# Patient Record
Sex: FEMALE | Race: WHITE | NOT HISPANIC OR LATINO | URBAN - METROPOLITAN AREA
[De-identification: names, ages, dates, MRNs, and addresses within clinical notes are randomized per-mention and may not be internally consistent; named-entity substitution may affect disease eponyms.]

---

## 2017-05-15 ENCOUNTER — APPOINTMENT (RX ONLY)
Dept: URBAN - METROPOLITAN AREA CLINIC 44 | Facility: CLINIC | Age: 47
Setting detail: DERMATOLOGY
End: 2017-05-15

## 2017-05-15 DIAGNOSIS — L71.8 OTHER ROSACEA: ICD-10-CM | Status: STABLE

## 2017-05-15 PROCEDURE — 99213 OFFICE O/P EST LOW 20 MIN: CPT

## 2017-05-15 PROCEDURE — ? PRESCRIPTION

## 2017-05-15 PROCEDURE — ? COUNSELING

## 2017-05-15 PROCEDURE — ? OTHER

## 2017-05-15 PROCEDURE — ? TREATMENT REGIMEN

## 2017-05-15 RX ORDER — IVERMECTIN 10 MG/G
CREAM TOPICAL QAM
Qty: 1 | Refills: 6 | Status: ERX

## 2017-05-15 RX ORDER — DOXYCYCLINE 40 MG/1
CAPSULE ORAL
Qty: 30 | Refills: 11 | Status: ERX

## 2017-05-15 RX ORDER — PIMECROLIMUS 10 MG/G
CREAM TOPICAL
Qty: 1 | Refills: 0 | Status: ERX | COMMUNITY
Start: 2017-05-15

## 2017-05-15 RX ADMIN — PIMECROLIMUS: 10 CREAM TOPICAL at 15:07

## 2017-05-15 ASSESSMENT — LOCATION SIMPLE DESCRIPTION DERM
LOCATION SIMPLE: LEFT CHEEK
LOCATION SIMPLE: RIGHT CHEEK

## 2017-05-15 ASSESSMENT — LOCATION DETAILED DESCRIPTION DERM
LOCATION DETAILED: LEFT INFERIOR CENTRAL MALAR CHEEK
LOCATION DETAILED: RIGHT INFERIOR CENTRAL MALAR CHEEK

## 2017-05-15 ASSESSMENT — LOCATION ZONE DERM: LOCATION ZONE: FACE

## 2017-05-15 NOTE — PROCEDURE: OTHER
Note Text (......Xxx Chief Complaint.): This diagnosis correlates with the
Detail Level: Zone
Other (Free Text): Discussed BBL for blood vessels around neck and face, discussed procedure and pricing.

## 2023-07-21 NOTE — PROCEDURE: TREATMENT REGIMEN
Continue Regimen: Soolantra 1% apply to full face qd, Oracea 40mg one tab po qd 30 min before meal.
Modify Regimen: Add Elidel prn for itching
Detail Level: Simple
Prednisone Counseling:  I discussed with the patient the risks of prolonged use of prednisone including but not limited to weight gain, insomnia, osteoporosis, mood changes, diabetes, susceptibility to infection, glaucoma and high blood pressure.  In cases where prednisone use is prolonged, patients should be monitored with blood pressure checks, serum glucose levels and an eye exam.  Additionally, the patient may need to be placed on GI prophylaxis, PCP prophylaxis, and calcium and vitamin D supplementation and/or a bisphosphonate.  The patient verbalized understanding of the proper use and the possible adverse effects of prednisone.  All of the patient's questions and concerns were addressed.